# Patient Record
Sex: FEMALE
[De-identification: names, ages, dates, MRNs, and addresses within clinical notes are randomized per-mention and may not be internally consistent; named-entity substitution may affect disease eponyms.]

---

## 2019-03-28 ENCOUNTER — HOSPITAL ENCOUNTER (EMERGENCY)
Dept: HOSPITAL 31 - C.ER | Age: 19
Discharge: HOME | End: 2019-03-28
Payer: COMMERCIAL

## 2019-03-28 VITALS
RESPIRATION RATE: 16 BRPM | SYSTOLIC BLOOD PRESSURE: 119 MMHG | HEART RATE: 85 BPM | OXYGEN SATURATION: 97 % | DIASTOLIC BLOOD PRESSURE: 75 MMHG | TEMPERATURE: 99.2 F

## 2019-03-28 DIAGNOSIS — N64.4: Primary | ICD-10-CM

## 2019-03-28 NOTE — C.PDOC
History Of Present Illness


17 y/o female,with no significant PMhx, presents to the ER complaining of lump 

to left lateral breast which has been present for " a while." Patient states 

that the pain increases when she has her period. Patient reports that her LMP 

was in the beginning of February. She notes that her period is late and she will

follow up with PCP regarding this issue.Denies having erythema and swelling to 

the area, nipple discharge, fever, and chills. 


Chief Complaint (Nursing): Breast Problem


History Per: Patient


History/Exam Limitations: no limitations


Onset/Duration Of Symptoms: Days


Current Symptoms Are (Timing): Still Present


Severity: Moderate





Past Medical History


Reviewed: Historical Data, Nursing Documentation, Vital Signs


Vital Signs: 





                                Last Vital Signs











Temp  99.2 F   03/28/19 10:02


 


Pulse  85   03/28/19 10:02


 


Resp  16   03/28/19 10:02


 


BP  119/75   03/28/19 10:02


 


Pulse Ox  97   03/28/19 10:02














- Medical History


PMH: No Chronic Diseases


Surgical History: No Surg Hx


Family History: States: No Known Family Hx





- Social History


Hx Alcohol Use: Yes


Hx Substance Use: No





- Immunization History


Hx Tetanus Toxoid Vaccination: No


Hx Influenza Vaccination: No


Hx Pneumococcal Vaccination: No





Review Of Systems


Except As Marked, All Systems Reviewed And Found Negative.


Constitutional: Negative for: Fever, Chills


Skin: Positive for: Other (lump to left breast)





Physical Exam





- Physical Exam


Appears: Non-toxic, No Acute Distress


Skin: Normal Color, Warm, Dry


Head: Atraumatic, Normacephalic


Eye(s): bilateral: Normal Inspection


Nose: Normal


Oral Mucosa: Moist


Neck: Supple


Lymphatic: Other (no axillary or supraclavicular nodes)


Chest: Symmetrical, Tenderness (mild tenderness to left breast), Other (mild 

thickening to RUQ and RLQ of left breast, similar thickening to right breast, no

nipple discharge, no skin changes, no discreet nodule palpated)


Cardiovascular: Rhythm Regular


Respiratory: Normal Breath Sounds, No Rales, No Rhonchi, No Wheezing


Neurological/Psych: Oriented x3, Normal Speech





ED Course And Treatment


O2 Sat by Pulse Oximetry: 97 (RA)


Pulse Ox Interpretation: Normal





Medical Decision Making


Medical Decision Making: 





Patient has been discharged and instructed to follow up with PMD.





Disposition


Counseled Patient/Family Regarding: Studies Performed, Diagnosis, Need For 

Followup





- Disposition


Referrals: 


Shaik Peralta MD [Staff Provider] - 


Disposition: HOME/ ROUTINE


Disposition Time: 10:47


Condition: GOOD


Additional Instructions: 





OSIRIS JENSEN, thank you for letting us take care of you today. Your provider 

was Lisa Hinson MD and you were treated for LT BREAST PAIN. The emergency 

medical care you received today was directed at your acute symptoms. If you were

prescribed any medication, please fill it and take as directed. It may take 

several days for your symptoms to resolve. Return to the Emergency Department if

your symptoms worsen, do not improve, or if you have any other problems.





Please contact your doctor for a follow up appointment in 1-2 days. Bring any 

paperwork you were given at discharge with you along with any medications you 

are taking to your follow up visit. Our treatment cannot replace ongoing medical

care by a primary care provider outside of the emergency department.





Thank you for allowing the [facility] team to be part of your care today.





Instructions:  Common Breast Problems


Forms:  [employer] (English)





- POA


Present On Arrival: None





- Clinical Impression


Clinical Impression: 


 Pain of breast








- Scribe Statement


The provider has reviewed the documentation as recorded by the Joseibe


Irvin Vegas


Provider Attestation: 





All medical record entries made by the Scribe were at my direction and 

personally dictated by me. I have reviewed the chart and agree that the record 

accurately reflects my personal performance of the history, physical exam, 

medical decision making, and the department course for this patient. I have also

personally directed, reviewed, and agree with the discharge instructions and 

disposition.